# Patient Record
Sex: FEMALE | Race: WHITE | Employment: FULL TIME | URBAN - METROPOLITAN AREA
[De-identification: names, ages, dates, MRNs, and addresses within clinical notes are randomized per-mention and may not be internally consistent; named-entity substitution may affect disease eponyms.]

---

## 2017-07-19 ENCOUNTER — RADIANT APPOINTMENT (OUTPATIENT)
Dept: CT IMAGING | Facility: CLINIC | Age: 47
End: 2017-07-19
Attending: FAMILY MEDICINE

## 2017-07-19 DIAGNOSIS — Z13.6 SCREENING FOR HEART DISEASE: ICD-10-CM

## 2017-07-19 PROCEDURE — 75571 CT HRT W/O DYE W/CA TEST: CPT | Performed by: INTERNAL MEDICINE

## 2017-09-29 ENCOUNTER — THERAPY VISIT (OUTPATIENT)
Dept: PHYSICAL THERAPY | Facility: CLINIC | Age: 47
End: 2017-09-29
Payer: COMMERCIAL

## 2017-09-29 DIAGNOSIS — M25.561 RIGHT KNEE PAIN: ICD-10-CM

## 2017-09-29 DIAGNOSIS — M25.562 LEFT KNEE PAIN: Primary | ICD-10-CM

## 2017-09-29 PROCEDURE — 97161 PT EVAL LOW COMPLEX 20 MIN: CPT | Mod: GP | Performed by: PHYSICAL THERAPIST

## 2017-09-29 PROCEDURE — 97110 THERAPEUTIC EXERCISES: CPT | Mod: GP | Performed by: PHYSICAL THERAPIST

## 2017-09-29 ASSESSMENT — ACTIVITIES OF DAILY LIVING (ADL)
SIT WITH YOUR KNEE BENT: ACTIVITY IS MINIMALLY DIFFICULT
PAIN: THE SYMPTOM AFFECTS MY ACTIVITY MODERATELY
HOW_WOULD_YOU_RATE_THE_OVERALL_FUNCTION_OF_YOUR_KNEE_DURING_YOUR_USUAL_DAILY_ACTIVITIES?: ABNORMAL
SWELLING: THE SYMPTOM AFFECTS MY ACTIVITY SEVERELY
STIFFNESS: THE SYMPTOM AFFECTS MY ACTIVITY MODERATELY
SQUAT: ACTIVITY IS VERY DIFFICULT
KNEE_ACTIVITY_OF_DAILY_LIVING_SCORE: 57.14
RISE FROM A CHAIR: ACTIVITY IS SOMEWHAT DIFFICULT
GO DOWN STAIRS: ACTIVITY IS SOMEWHAT DIFFICULT
STAND: ACTIVITY IS MINIMALLY DIFFICULT
WALK: ACTIVITY IS MINIMALLY DIFFICULT
RAW_SCORE: 40
WEAKNESS: THE SYMPTOM AFFECTS MY ACTIVITY MODERATELY
GIVING WAY, BUCKLING OR SHIFTING OF KNEE: I HAVE THE SYMPTOM BUT IT DOES NOT AFFECT MY ACTIVITY
AS_A_RESULT_OF_YOUR_KNEE_INJURY,_HOW_WOULD_YOU_RATE_YOUR_CURRENT_LEVEL_OF_DAILY_ACTIVITY?: ABNORMAL
KNEEL ON THE FRONT OF YOUR KNEE: ACTIVITY IS VERY DIFFICULT
KNEE_ACTIVITY_OF_DAILY_LIVING_SUM: 40
LIMPING: I DO NOT HAVE THE SYMPTOM
GO UP STAIRS: ACTIVITY IS MINIMALLY DIFFICULT

## 2017-09-29 NOTE — MR AVS SNAPSHOT
"              After Visit Summary   9/29/2017    Rochelle Clark    MRN: [id number]           Patient Information     Date Of Birth          1970        Visit Information        Provider Department      9/29/2017 3:20 PM Liana Garcia, LD [facility] Physical Therapy        Today's Diagnoses     Left knee pain    -  1    Right knee pain           Follow-ups after your visit        Your next 10 appointments already scheduled     Oct 06, 2017  3:20 PM CDT   LEXIE Extremity with Liana Garcia PT   [facility] ([facility])    [address]  [facility] [id number] [phone]              Who to contact     If you have questions or need follow up information about today's clinic visit or your schedule please contact [facility] PHYSICAL [facility] directly at [phone].  Normal or non-critical lab and imaging results will be communicated to you by [employer], letter or phone within 4 business days after the clinic has received the results. If you do not hear from us within 7 days, please contact the clinic through [employer] or phone. If you have a critical or abnormal lab result, we will notify you by phone as soon as possible.  Submit refill requests through [employer] or call your pharmacy and they will forward the refill request to us. Please allow 3 business days for your refill to be completed.          Additional Information About Your Visit        MyChart Information     [employer] lets you send messages to your doctor, view your test results, renew your prescriptions, schedule appointments and more. To sign up, go to [email or web address] . Click on \"Log in\" on the left side of the screen, which will take you to the Welcome page. Then click on \"Sign up Now\" on the right side of the page.     You will be asked to enter the access code listed below, as well as some personal " information. Please follow the directions to create your username and password.     Your access code is: [email or web address]  Expires: 2017  4:00 PM     Your access code will  in 90 days. If you need help or a new code, please call your [place] clinic or [phone].        Care EveryWhere ID     This is your Care EveryWhere ID. This could be used by other organizations to access your [place] medical records  [id number]         Blood Pressure from Last 3 Encounters:   10/23/03 102/64   03 92/56   03 106/66    Weight from Last 3 Encounters:   10/23/03 72.9 kg (160 lb 12 oz)   03 71.2 kg (157 lb)   03 71.2 kg (157 lb)              We Performed the Following     LEXIE Inital Eval Report     PT Eval, Low Complexity (01819)     Therapeutic Exercises        Primary Care Provider Office Phone # Fax #    Jarocho Arvizu PA-C [phone] [address] [facility] [address]        Equal Access to Services     [facility]: Hadii aad ku hadasho Solondonali, waaxda luqadaha, qaybta kaalmada juan, mily pappas . So [facility] [phone].    ATENCIÓN: Si habla español, tiene a miranda disposición servicios gratuitos de asistencia lingüística. [facility] [phone].    We comply with applicable federal civil rights laws and [place] laws. We do not discriminate on the basis of race, color, national origin, age, disability, sex, sexual orientation, or gender identity.            Thank you!     Thank you for choosing [facility] PHYSICAL THERAP[facility]  for your care. Our goal is always to provide you with excellent care. Hearing back from our patients is one way we can continue to improve our services. Please take a few minutes to complete the written survey that you may receive in the mail after your visit with us. Thank you!             Your Updated Medication List - Protect others around you: Learn how to safely use,  store and throw away your medicines at www.disposemymeds.org.          This list is accurate as of: 9/29/17  4:00 PM.  Always use your most recent med list.                   Brand Name Dispense Instructions for use Diagnosis    CIPRO 500 MG tablet   Generic drug:  ciprofloxacin      1 TAB PO BID (Twice per day) X 10 DAYS        TRIPHASIL per tablet   Generic drug:  levonorgestrel-ethinyl estradiol     3 months    1 tab PO QD (Once per day)        WELLBUTRIN  MG Tbcr     60    1 TABLET TWICE DAILY        ZOLOFT 50 MG tablet   Generic drug:  sertraline     30    1 TAB PO QD (Once per day)

## 2017-09-29 NOTE — PROGRESS NOTES
"  [facility] Initial Evaluation      Subjective:    Patient is a 46 year old female presenting with rehab left knee hpi. The history is provided by the patient.   Rochelle Clark is a 46 year old female with a bilateral knees condition.  Condition occurred with:  Insidious onset.  Condition occurred: for unknown reasons.  This is a chronic condition  Reports L knee pain/\"giving way\" in knee for about 3-4 years. She notes increased swelling and pain in L knee since 7/2017. She c/o onset of R lateral knee pain  in 7/2017.Currently, L knee pain is worse that the right especially on stairs, squatting and with recreational activities. She had a MRI on L knee showing arthritis and chondromalacia and X-ray of R knee(normal). She had a ACL repair in 2005 and arthroscopic knee surgery for cartilage tear in 2002  .    Patient reports pain:  In the joint and other (lateral knee R).    Pain is described as sharp and aching and is intermittent and reported as 5/10.  Associated symptoms:  Buckling/giving out and edema. Pain is the same all the time.  Symptoms are exacerbated by bending/squatting, ascending stairs, descending stairs and kneeling and relieved by ice.  Since onset symptoms are gradually worsening.  Special tests:  MRI and x-ray.  Previous treatment includes surgery and other (ACL on L 2015, meniscus 2002 L).    General health as reported by patient is good.  Pertinent medical history includes:  None.  Medical allergies: yes (sulfa).  Other surgeries include:  Orthopedic surgery.  Current medications:  None as reported by the patient.  Current occupation is .  Patient is working in normal job without restrictions.          Red flags:  None as reported by the patient.                        Objective:          Flexibility/Screens:       Lower Extremity:  Decreased left lower extremity flexibility:Hip Flexors; IT Band and Quadriceps    Decreased right lower extremity flexibility:  " "Piriformis; Hip Flexors; IT Band and Quadriceps                                                      Knee Evaluation:  ROM:    AROM    Hyperextension:  Left:  0    Right: 0  Extension:  Left: 0    Right:  0  Flexion: Left: 135    Right: 135  PROM    Hyperextension: Left: 0    Right:  4  Extension: Left: 0    Right:  0  Flexion: Left: 140    Right:  140    Endfeel: hard end feel L knee EXT        Palpation:    Left knee tenderness present at:  Patellar Tendon  Right knee tenderness present at:  Lateral Joint Line; Patellar Tendon; IT Band and Patellar Lateral  Edema:  Edema of the knee: minimal edema at B knee joint     Mobility Testing:      Patellofemoral Medial:  Left: hypomobile    Right: hypomobile    Patellofemoral Superior:  Right: hypomobile    Functional Testing:          Quad:    Single Leg Squat:  Left:       Right:        Bilateral Leg Squat:  Pain free, deviates toward R  Normal control    Retro Step Up: Left:  \" x10 poor knee control with PT pain, compensates with hip drop    Right: 6\" x15 no pain, mild control issues in knee    % of Uninvolved:           General     ROS    Assessment/Plan:      Patient is a 46 year old female with both sides knee complaints.    Patient has the following significant findings with corresponding treatment plan.                Diagnosis 1:  B knee pain  Pain -  hot/cold therapy, manual therapy, self management, education and home program  Decreased ROM/flexibility - manual therapy, therapeutic exercise and home program  Decreased joint mobility - manual therapy, therapeutic exercise and home program  Decreased strength - therapeutic exercise, therapeutic activities and home program  Edema - cold therapy and self management/home program  Impaired muscle performance - neuro re-education and home program  Decreased function - therapeutic activities and home program    Therapy Evaluation Codes:   1) History comprised of:   Personal factors that impact the plan of care:  "     None.    Comorbidity factors that impact the plan of care are:      None.     Medications impacting care: None.  2) Examination of Body Systems comprised of:   Body structures and functions that impact the plan of care:      Knee.   Activity limitations that impact the plan of care are:      Jumping, Running, Sports, Squatting/kneeling and Stairs.  3) Clinical presentation characteristics are:   Stable/Uncomplicated.  4) Decision-Making    Low complexity using standardized patient assessment instrument and/or measureable assessment of functional outcome.  Cumulative Therapy Evaluation is: Low complexity.    Previous and current functional limitations:  (See Goal Flow Sheet for this information)    Short term and Long term goals: (See Goal Flow Sheet for this information)     Communication ability:  Patient appears to be able to clearly communicate and understand verbal and written communication and follow directions correctly.  Treatment Explanation - The following has been discussed with the patient:   RX ordered/plan of care  Anticipated outcomes  Possible risks and side effects  This patient would benefit from PT intervention to resume normal activities.   Rehab potential is good.    Frequency:  1 X week, once daily  Duration:  For 12 weeks  Discharge Plan:  Achieve all LTG.  Independent in home treatment program.  Reach maximal therapeutic benefit.    Please refer to the daily flowsheet for treatment today, total treatment time and time spent performing 1:1 timed codes.

## 2017-09-29 NOTE — LETTER
[facility]  [address]  [place] [facility] [id number] [phone]    2017    Re: Rochelle Calrk   :   1970  MRN:  [id number]   REFERRING PHYSICIAN:   Tej Treviño    [facility]    Date of Initial Evaluation:  2017  Visits:  Rxs Used: 1  Reason for Referral:     Left knee pain  Right knee pain    EVALUATION SUMMARY  [facility]    Subjective:    Rochelle Clark is a 46 year old female with a bilateral knees condition.  Condition occurred with:  Insidious onset.  Condition occurred: for unknown reasons.  This is a chronic condition  Reports L knee pain/\"giving way\" in knee for about 3-4 years. She notes increased swelling and pain in L knee since 2017. She c/o onset of R lateral knee pain  in 2017.Currently, L knee pain is worse that the right especially on stairs, squatting and with recreational activities. She had a MRI on L knee showing arthritis and chondromalacia and X-ray of R knee(normal). She had a ACL repair in  and arthroscopic knee surgery for cartilage tear in .  Patient reports pain:  In the joint and other (lateral knee R).    Pain is described as sharp and aching and is intermittent and reported as 5/10.  Associated symptoms:  Buckling/giving out and edema. Pain is the same all the time.  Symptoms are exacerbated by bending/squatting, ascending stairs, descending stairs and kneeling and relieved by ice.  Since onset symptoms are gradually worsening.  Special tests:  MRI and x-ray.  Previous treatment includes surgery and other (ACL on L , meniscus  L).    General health as reported by patient is good.  Pertinent medical history includes:  None.  Medical allergies: yes (sulfa).  Other surgeries include:  Orthopedic surgery.  Current medications:  None as reported by the patient.  Current " "occupation is .  Patient is working in normal job without restrictions.  Red flags:  None as reported by the patient.    Objective:    Flexibility/Screens:   Lower Extremity:  Decreased left lower extremity flexibility:Hip Flexors; IT Band and Quadriceps  Decreased right lower extremity flexibility:  Piriformis; Hip Flexors; IT Band and Quadriceps        Re: Rochelle Clark   :   1970-Page 2       Knee Evaluation:  ROM:    AROM  Hyperextension:  Left:  0    Right: 0  Extension:  Left: 0    Right:  0  Flexion: Left: 135    Right: 135  PROM  Hyperextension: Left: 0    Right:  4  Extension: Left: 0    Right:  0  Flexion: Left: 140    Right:  140  Endfeel: hard end feel L knee EXT  Palpation:    Left knee tenderness present at:  Patellar Tendon  Right knee tenderness present at:  Lateral Joint Line; Patellar Tendon; IT Band and Patellar Lateral  Edema:  Edema of the knee: minimal edema at B knee joint   Mobility Testing:    Patellofemoral Medial:  Left: hypomobile    Right: hypomobile  Patellofemoral Superior:  Right: hypomobile  Functional Testing:    Quad:    Single Leg Squat:  Left:       Right:        Bilateral Leg Squat:  Pain free, deviates toward R  Normal control    Retro Step Up: Left:  \" x10 poor knee control with PT pain, compensates with hip drop    Right: 6\" x15 no pain, mild control issues in knee    % of Uninvolved:     Assessment/Plan:      Patient is a 46 year old female with both sides knee complaints.    Patient has the following significant findings with corresponding treatment plan.                Diagnosis 1:  B knee pain  Pain -  hot/cold therapy, manual therapy, self management, education and home program  Decreased ROM/flexibility - manual therapy, therapeutic exercise and home program  Decreased joint mobility - manual therapy, therapeutic exercise and home program  Decreased strength - therapeutic exercise, therapeutic activities and home program  Edema - cold therapy and self " management/home program  Impaired muscle performance - neuro re-education and home program  Decreased function - therapeutic activities and home program    Therapy Evaluation Codes:   1) History comprised of:   Personal factors that impact the plan of care:      None.    Comorbidity factors that impact the plan of care are:      None.     Medications impacting care: None.  2) Examination of Body Systems comprised of:  Re: Rochelle TRISTON Amber   :   1970-Page 3     Body structures and functions that impact the plan of care:      Knee.   Activity limitations that impact the plan of care are:      Jumping, Running, Sports, Squatting/kneeling and Stairs.  3) Clinical presentation characteristics are:   Stable/Uncomplicated.  4) Decision-Making    Low complexity using standardized patient assessment instrument and/or measureable assessment of functional outcome.  Cumulative Therapy Evaluation is: Low complexity.    Previous and current functional limitations:  (See Goal Flow Sheet for this information)    Short term and Long term goals: (See Goal Flow Sheet for this information)     Communication ability:  Patient appears to be able to clearly communicate and understand verbal and written communication and follow directions correctly.  Treatment Explanation - The following has been discussed with the patient:   RX ordered/plan of care  Anticipated outcomes  Possible risks and side effects  This patient would benefit from PT intervention to resume normal activities.   Rehab potential is good.    Frequency:  1 X week, once daily  Duration:  For 12 weeks  Discharge Plan:  Achieve all LTG.  Independent in home treatment program.  Reach maximal therapeutic benefit.    Thank you for your referral.    INQUIRIES  Therapist: Liana Garcia, PT  [facility] PHYSICAL THERAP[facility]  [address] [place] [address]. [address]  [facility] [address]  Phone: [phone]  Fax: [phone]

## 2017-10-06 ENCOUNTER — THERAPY VISIT (OUTPATIENT)
Dept: PHYSICAL THERAPY | Facility: CLINIC | Age: 47
End: 2017-10-06
Payer: COMMERCIAL

## 2017-10-06 DIAGNOSIS — M25.561 RIGHT KNEE PAIN: ICD-10-CM

## 2017-10-06 DIAGNOSIS — M25.562 LEFT KNEE PAIN: Primary | ICD-10-CM

## 2017-10-06 PROCEDURE — 97530 THERAPEUTIC ACTIVITIES: CPT | Mod: GP | Performed by: PHYSICAL THERAPIST

## 2017-10-06 PROCEDURE — 97110 THERAPEUTIC EXERCISES: CPT | Mod: GP | Performed by: PHYSICAL THERAPIST

## 2017-10-06 PROCEDURE — 97140 MANUAL THERAPY 1/> REGIONS: CPT | Mod: GP | Performed by: PHYSICAL THERAPIST

## 2017-10-06 NOTE — PROGRESS NOTES
"Subjective:    HPI                    Objective:    System    Physical Exam    General     ROS    Assessment/Plan:      SUBJECTIVE  Subjective changes as noted by pt:  My knees are feeling better overall and I like the knee EXT stretch on the L- felt better after doing it. My R hip is painful in past 2 days- unsure of the reason. HEP is going well.    Current pain level: 3/10     Changes in function:  Yes, see flow sheet     Adverse reaction to treatment or activity:  None    OBJECTIVE  Changes in objective findings:  Hypomobility PF medial/inferior glides and end range EXT- added mobs and tolerated well.  Instructed in side 4\" step- R knee good control 30 reps, L  Poor knee/hip control, mild discomfort at 10 reps. Added supported knee bends- pain free knees with good technique.        ASSESSMENT  Rochelle continues to require intervention to meet STG and LTG's: PT  Patient is becoming more independent in home exercise program  Response to therapy has shown an improvement in  pain level, muscle control and function  Progress made towards STG/LTG?  None    PLAN  Current treatment program is being advanced to more complex exercises.  The following procedures have been added:  strengthening, therapeutic activities and manual therapy    PTA/ATC plan:  N/A    Please refer to the daily flowsheet for treatment today, total treatment time and time spent performing 1:1 timed codes.              "

## 2017-10-06 NOTE — MR AVS SNAPSHOT
"              After Visit Summary   10/6/2017    Rochelle Clark    MRN: [id number]           Patient Information     Date Of Birth          1970        Visit Information        Provider Department      10/6/2017 3:20 PM Liana Garcia, LD [facility] Physical Therapy        Today's Diagnoses     Left knee pain    -  1    Right knee pain           Follow-ups after your visit        Your next 10 appointments already scheduled     Oct 20, 2017  1:20 PM CDT   LEXIE Extremity with Liana Garcia PT   [facility] ([facility]    [address]  [place] [id number] [phone]              Who to contact     If you have questions or need follow up information about today's clinic visit or your schedule please contact [facility] ATHLETIC [facility] PHYSICAL [facility] directly at [phone].  Normal or non-critical lab and imaging results will be communicated to you by [employer], letter or phone within 4 business days after the clinic has received the results. If you do not hear from us within 7 days, please contact the clinic through [employer] or phone. If you have a critical or abnormal lab result, we will notify you by phone as soon as possible.  Submit refill requests through [employer] or call your pharmacy and they will forward the refill request to us. Please allow 3 business days for your refill to be completed.          Additional Information About Your Visit        MyChart Information     [employer] lets you send messages to your doctor, view your test results, renew your prescriptions, schedule appointments and more. To sign up, go to [email or web address] . Click on \"Log in\" on the left side of the screen, which will take you to the Welcome page. Then click on \"Sign up Now\" on the right side of the page.     You will be asked to enter the access code listed below, as well as some personal " information. Please follow the directions to create your username and password.     Your access code is: [email or web address]  Expires: 2017  4:00 PM     Your access code will  in 90 days. If you need help or a new code, please call your [place] clinic or [phone].        Care EveryWhere ID     This is your Care EveryWhere ID. This could be used by other organizations to access your [place] medical records  [id number]         Blood Pressure from Last 3 Encounters:   10/23/03 102/64   03 92/56   03 106/66    Weight from Last 3 Encounters:   10/23/03 72.9 kg (160 lb 12 oz)   03 71.2 kg (157 lb)   03 71.2 kg (157 lb)              We Performed the Following     Manual Ther Tech, 1+[facility], EA 15 min     Therapeutic Activities     Therapeutic Exercises        Primary Care Provider Office Phone # Fax #    Jarocho Arvziu PA-C [phone] [address] [facility] [address]        Equal Access to Services     [facility]: Hadii aad ku hadasho Soomaali, waaxda luqadaha, qaybta kaalmada adeegyaervin, mily pappas . So [facility] [phone].    ATENCIÓN: Si habla español, tiene a miranda disposición servicios gratuitos de asistencia lingüística. [facility] [phone].    We comply with applicable federal civil rights laws and [place] laws. We do not discriminate on the basis of race, color, national origin, age, disability, sex, sexual orientation, or gender identity.            Thank you!     Thank you for choosing [facility] PHYSICAL THERAPY  for your care. Our goal is always to provide you with excellent care. Hearing back from our patients is one way we can continue to improve our services. Please take a few minutes to complete the written survey that you may receive in the mail after your visit with us. Thank you!             Your Updated Medication List - Protect others around you: Learn how to safely  use, store and throw away your medicines at www.disposemymeds.org.          This list is accurate as of: 10/6/17  4:22 PM.  Always use your most recent med list.                   Brand Name Dispense Instructions for use Diagnosis    CIPRO 500 MG tablet   Generic drug:  ciprofloxacin      1 TAB PO BID (Twice per day) X 10 DAYS        TRIPHASIL per tablet   Generic drug:  levonorgestrel-ethinyl estradiol     3 months    1 tab PO QD (Once per day)        WELLBUTRIN  MG Tbcr     60    1 TABLET TWICE DAILY        ZOLOFT 50 MG tablet   Generic drug:  sertraline     30    1 TAB PO QD (Once per day)

## 2017-10-20 ENCOUNTER — THERAPY VISIT (OUTPATIENT)
Dept: PHYSICAL THERAPY | Facility: CLINIC | Age: 47
End: 2017-10-20
Payer: COMMERCIAL

## 2017-10-20 DIAGNOSIS — M25.561 RIGHT KNEE PAIN: ICD-10-CM

## 2017-10-20 DIAGNOSIS — M25.562 LEFT KNEE PAIN: ICD-10-CM

## 2017-10-20 PROCEDURE — 97140 MANUAL THERAPY 1/> REGIONS: CPT | Mod: GP | Performed by: PHYSICAL THERAPIST

## 2017-10-20 PROCEDURE — 97110 THERAPEUTIC EXERCISES: CPT | Mod: GP | Performed by: PHYSICAL THERAPIST

## 2017-10-20 PROCEDURE — 97530 THERAPEUTIC ACTIVITIES: CPT | Mod: GP | Performed by: PHYSICAL THERAPIST

## 2017-10-20 NOTE — PROGRESS NOTES
"Subjective:    HPI                    Objective:    System    Physical Exam    General     ROS    Assessment/Plan:      SUBJECTIVE  Subjective changes as noted by pt:  I'm in a class to learn yoga teaching techniques and my knees are feeling ok. I did go up/down stairs at home and my knees felt really good. My R knee is recovering but the L knee is still bothering me more overall.    Current pain level: 1/10     Changes in function:  Yes (See Goal flowsheet attached for changes in current functional level)     Adverse reaction to treatment or activity:  None    OBJECTIVE  Changes in objective findings:  Yes, increased reps of all strengthening exercises- advanced to unsupported knee bends, leg EXT bridge and descending step on 4\" as tolerated- mild discomfort and difficulty maintaining neutral pelvis on L. Improved control hip/knee and strength side step on 6\" B.  Increased PROM knee EXT on L- 1-2 degrees hyperextension.        ASSESSMENT  Rochelle continues to require intervention to meet STG and LTG's: PT  Patient's symptoms are resolving.  Patient is progressing as expected.  Patient is becoming more independent in home exercise program  Response to therapy has shown an improvement in  pain level, flexibility, strength, muscle control and function  Progress made towards STG/LTG?  Yes (See Goal flowsheet attached for updates on achievement of STG and LTG)    PLAN  Current treatment program is being advanced to more complex exercises.  The following procedures have been added:  strengthening and therapeutic activities      PTA/ATC plan:  N/A    Please refer to the daily flowsheet for treatment today, total treatment time and time spent performing 1:1 timed codes.              "

## 2017-10-20 NOTE — MR AVS SNAPSHOT
"              After Visit Summary   10/20/2017    Rochelle Clark    MRN: [id number]           Patient Information     Date Of Birth          1970        Visit Information        Provider Department      10/20/2017 1:20 PM Liana Garcia PT [facility]        Today's Diagnoses     Left knee pain        Right knee pain           Follow-ups after your visit        Your next 10 appointments already scheduled     Nov 03, 2017 12:40 PM CDT   LEXIE Extremity with Liana Garcia PT   [facility] ([facility])    [address]  [facility] [id number] [phone]              Who to contact     If you have questions or need follow up information about today's clinic visit or your schedule please contact [facility] directly at [phone].  Normal or non-critical lab and imaging results will be communicated to you by MyChart, letter or phone within 4 business days after the clinic has received the results. If you do not hear from us within 7 days, please contact the clinic through [employer] or phone. If you have a critical or abnormal lab result, we will notify you by phone as soon as possible.  Submit refill requests through Nitronex or call your pharmacy and they will forward the refill request to us. Please allow 3 business days for your refill to be completed.          Additional Information About Your Visit        MyChart Information     Nitronex lets you send messages to your doctor, view your test results, renew your prescriptions, schedule appointments and more. To sign up, go to [email or web address] . Click on \"Log in\" on the left side of the screen, which will take you to the Welcome page. Then click on \"Sign up Now\" on the right side of the page.     You will be asked to enter the access code listed below, as well as some personal " information. Please follow the directions to create your username and password.     Your access code is: [email or web address]  Expires: 2017  4:00 PM     Your access code will  in 90 days. If you need help or a new code, please call your [facility] clinic or [phone].        Care EveryWhere ID     This is your Care EveryWhere ID. This could be used by other organizations to access your [facility] medical records  [id number]         Blood Pressure from Last 3 Encounters:   10/23/03 102/64   03 92/56   03 106/66    Weight from Last 3 Encounters:   10/23/03 72.9 kg (160 lb 12 oz)   03 71.2 kg (157 lb)   03 71.2 kg (157 lb)              We Performed the Following     Manual Ther Tech, 1+Regions, EA 15 min     Therapeutic Activities     Therapeutic Exercises        Primary Care Provider Office Phone # Fax #    Jarocho Arvizu PA-C [phone] [address] [facility] [address]        Equal Access to Services     [facility]: Hadii aad ku hadasho Soomaali, waaxda luqadaha, qaybta kaalmada adeegyaervin, mily pappas . So [facility] [phone].    ATENCIÓN: Si habla español, tiene a miranda disposición servicios gratuitos de asistencia lingüística. [facility] [phone].    We comply with applicable federal civil rights laws and [place] laws. We do not discriminate on the basis of race, color, national origin, age, disability, sex, sexual orientation, or gender identity.            Thank you!     Thank you for choosing [facility] PHYSICAL THERAP[facility]  for your care. Our goal is always to provide you with excellent care. Hearing back from our patients is one way we can continue to improve our services. Please take a few minutes to complete the written survey that you may receive in the mail after your visit with us. Thank you!             Your Updated Medication List - Protect others around you: Learn how to safely  use, store and throw away your medicines at www.disposemymeds.org.          This list is accurate as of: 10/20/17  2:10 PM.  Always use your most recent med list.                   Brand Name Dispense Instructions for use Diagnosis    CIPRO 500 MG tablet   Generic drug:  ciprofloxacin      1 TAB PO BID (Twice per day) X 10 DAYS        TRIPHASIL per tablet   Generic drug:  levonorgestrel-ethinyl estradiol     3 months    1 tab PO QD (Once per day)        WELLBUTRIN  MG Tbcr     60    1 TABLET TWICE DAILY        ZOLOFT 50 MG tablet   Generic drug:  sertraline     30    1 TAB PO QD (Once per day)

## 2017-11-10 ENCOUNTER — THERAPY VISIT (OUTPATIENT)
Dept: PHYSICAL THERAPY | Facility: CLINIC | Age: 47
End: 2017-11-10
Payer: COMMERCIAL

## 2017-11-10 DIAGNOSIS — M25.561 RIGHT KNEE PAIN: ICD-10-CM

## 2017-11-10 DIAGNOSIS — M25.562 LEFT KNEE PAIN: ICD-10-CM

## 2017-11-10 PROCEDURE — 97110 THERAPEUTIC EXERCISES: CPT | Mod: GP | Performed by: PHYSICAL THERAPIST

## 2017-11-10 PROCEDURE — 97530 THERAPEUTIC ACTIVITIES: CPT | Mod: GP | Performed by: PHYSICAL THERAPIST

## 2017-11-10 PROCEDURE — 97140 MANUAL THERAPY 1/> REGIONS: CPT | Mod: GP | Performed by: PHYSICAL THERAPIST

## 2017-11-10 ASSESSMENT — ACTIVITIES OF DAILY LIVING (ADL)
RISE FROM A CHAIR: ACTIVITY IS MINIMALLY DIFFICULT
STAND: ACTIVITY IS NOT DIFFICULT
RAW_SCORE: 53
WALK: ACTIVITY IS MINIMALLY DIFFICULT
SWELLING: I HAVE THE SYMPTOM BUT IT DOES NOT AFFECT MY ACTIVITY
GIVING WAY, BUCKLING OR SHIFTING OF KNEE: I DO NOT HAVE THE SYMPTOM
KNEE_ACTIVITY_OF_DAILY_LIVING_SCORE: 75.71
GO DOWN STAIRS: ACTIVITY IS SOMEWHAT DIFFICULT
STIFFNESS: I HAVE THE SYMPTOM BUT IT DOES NOT AFFECT MY ACTIVITY
HOW_WOULD_YOU_RATE_THE_OVERALL_FUNCTION_OF_YOUR_KNEE_DURING_YOUR_USUAL_DAILY_ACTIVITIES?: NEARLY NORMAL
LIMPING: I DO NOT HAVE THE SYMPTOM
AS_A_RESULT_OF_YOUR_KNEE_INJURY,_HOW_WOULD_YOU_RATE_YOUR_CURRENT_LEVEL_OF_DAILY_ACTIVITY?: NEARLY NORMAL
WEAKNESS: THE SYMPTOM AFFECTS MY ACTIVITY MODERATELY
SQUAT: ACTIVITY IS SOMEWHAT DIFFICULT
SIT WITH YOUR KNEE BENT: ACTIVITY IS NOT DIFFICULT
KNEE_ACTIVITY_OF_DAILY_LIVING_SUM: 53
PAIN: THE SYMPTOM AFFECTS MY ACTIVITY SLIGHTLY
KNEEL ON THE FRONT OF YOUR KNEE: ACTIVITY IS FAIRLY DIFFICULT
GO UP STAIRS: ACTIVITY IS MINIMALLY DIFFICULT

## 2017-11-10 NOTE — PROGRESS NOTES
"Subjective:    HPI                    Objective:    System    Physical Exam    General     ROS    Assessment/Plan:      SUBJECTIVE  Subjective changes as noted by pt:  My back \"went out\" last week so unable to do the full HEP. My knees are feeling about the same as previous session.       Current pain level: 1/10     Changes in function:  Yes (See Goal flowsheet attached for changes in current functional level)     Adverse reaction to treatment or activity:  None    OBJECTIVE  Changes in objective findings:  Yes, increased flexibility B ITB and quads. 6\" descending step painful  R gluteal- DC for today on R. Improved hip control on 6\" side step on L.hold on descending step until LBP resolves.  PROM L knee EXT 2. Corrected supported knee bend technique to allow pain free LB during exercise.        ASSESSMENT  Rochelle continues to require intervention to meet STG and LTG's: PT  Patient's symptoms are resolving.  Patient is progressing as expected.  Patient is becoming more independent in home exercise program  Response to therapy has shown an improvement in  pain level, flexibility, strength, muscle control, posture and function  Progress made towards STG/LTG?  Yes (See Goal flowsheet attached for updates on achievement of STG and LTG)    PLAN  Continue current treatment plan until patient demonstrates readiness to progress to higher level exercises.    PTA/ATC plan:  N/A    Please refer to the daily flowsheet for treatment today, total treatment time and time spent performing 1:1 timed codes.              "

## 2017-11-10 NOTE — MR AVS SNAPSHOT
"              After Visit Summary   11/10/2017    Rochelle Clark    MRN: [id number]           Patient Information     Date Of Birth          1970        Visit Information        Provider Department      11/10/2017 7:30 AM Liana Garcia PT [facility]        Today's Diagnoses     Left knee pain        Right knee pain           Follow-ups after your visit        Your next 10 appointments already scheduled     Nov 21, 2017  7:30 AM CST   LEXIE Extremity with Liana Garcia PT   [facility] ([facility])    [address]  [facility] [id number] [phone]              Who to contact     If you have questions or need follow up information about today's clinic visit or your schedule please contact [facility] directly at [phone].  Normal or non-critical lab and imaging results will be communicated to you by MyChart, letter or phone within 4 business days after the clinic has received the results. If you do not hear from us within 7 days, please contact the clinic through [employer] or phone. If you have a critical or abnormal lab result, we will notify you by phone as soon as possible.  Submit refill requests through Korbit or call your pharmacy and they will forward the refill request to us. Please allow 3 business days for your refill to be completed.          Additional Information About Your Visit        MyChart Information     [employer] lets you send messages to your doctor, view your test results, renew your prescriptions, schedule appointments and more. To sign up, go to [email or web address] . Click on \"Log in\" on the left side of the screen, which will take you to the Welcome page. Then click on \"Sign up Now\" on the right side of the page.     You will be asked to enter the access code listed below, as well as some personal " information. Please follow the directions to create your username and password.     Your access code is: [email or web address]  Expires: 2017  3:00 PM     Your access code will  in 90 days. If you need help or a new code, please call your [place] clinic or [phone].        Care EveryWhere ID     This is your Care EveryWhere ID. This could be used by other organizations to access your [place] medical records  [id number]         Blood Pressure from Last 3 Encounters:   10/23/03 102/64   03 92/56   03 106/66    Weight from Last 3 Encounters:   10/23/03 72.9 kg (160 lb 12 oz)   03 71.2 kg (157 lb)   03 71.2 kg (157 lb)              We Performed the Following     Manual Ther Tech, 1+Regions, EA 15 min     Therapeutic Activities     Therapeutic Exercises        Primary Care Provider Office Phone # Fax #    Jarocho Arvizu PA-C [phone] [address] [facility] [address]        Equal Access to Services     [facility]: Hadii aad ku hadasho Soomaali, waaxda luqadaha, qaybta kaalmada adeegyaervin, mily pappas . So [facility] [phone].    ATENCIÓN: Si habla español, tiene a miranda disposición servicios gratuitos de asistencia lingüística. [facility] [phone].    We comply with applicable federal civil rights laws and [place] laws. We do not discriminate on the basis of race, color, national origin, age, disability, sex, sexual orientation, or gender identity.            Thank you!     Thank you for choosing [facility]  for your care. Our goal is always to provide you with excellent care. Hearing back from our patients is one way we can continue to improve our services. Please take a few minutes to complete the written survey that you may receive in the mail after your visit with us. Thank you!             Your Updated Medication List - Protect others around you: Learn how to safely  use, store and throw away your medicines at www.disposemymeds.org.          This list is accurate as of: 11/10/17 12:12 PM.  Always use your most recent med list.                   Brand Name Dispense Instructions for use Diagnosis    CIPRO 500 MG tablet   Generic drug:  ciprofloxacin      1 TAB PO BID (Twice per day) X 10 DAYS        TRIPHASIL per tablet   Generic drug:  levonorgestrel-ethinyl estradiol     3 months    1 tab PO QD (Once per day)        WELLBUTRIN  MG Tbcr     60    1 TABLET TWICE DAILY        ZOLOFT 50 MG tablet   Generic drug:  sertraline     30    1 TAB PO QD (Once per day)